# Patient Record
Sex: FEMALE | Race: WHITE | Employment: FULL TIME | URBAN - METROPOLITAN AREA
[De-identification: names, ages, dates, MRNs, and addresses within clinical notes are randomized per-mention and may not be internally consistent; named-entity substitution may affect disease eponyms.]

---

## 2016-12-30 LAB
BASOPHILS ABSOLUTE: 0 /ΜL
BASOPHILS RELATIVE PERCENT: 0.6 %
BUN BLDV-MCNC: 14 MG/DL
CALCIUM SERPL-MCNC: 9 MG/DL
CHLORIDE BLD-SCNC: 105 MMOL/L
CHOLESTEROL, TOTAL: 170 MG/DL
CHOLESTEROL/HDL RATIO: 2.7
CO2: 28 MMOL/L
CREAT SERPL-MCNC: 0.68 MG/DL
EOSINOPHILS ABSOLUTE: 0.2 /ΜL
EOSINOPHILS RELATIVE PERCENT: 2.1 %
GFR CALCULATED: >60
GLUCOSE BLD-MCNC: 91 MG/DL
HCT VFR BLD CALC: 34.6 % (ref 36–46)
HDLC SERPL-MCNC: 64 MG/DL (ref 35–70)
HEMOGLOBIN: 11.9 G/DL (ref 12–16)
LDL CHOLESTEROL CALCULATED: 94 MG/DL (ref 0–160)
LYMPHOCYTES ABSOLUTE: 2.3 /ΜL
LYMPHOCYTES RELATIVE PERCENT: 29.7 %
MCH RBC QN AUTO: 30.9 PG
MCHC RBC AUTO-ENTMCNC: 34.3 G/DL
MCV RBC AUTO: 90 FL
MONOCYTES ABSOLUTE: 0.7 /ΜL
MONOCYTES RELATIVE PERCENT: 8.8 %
NEUTROPHILS ABSOLUTE: 4.5 /ΜL
NEUTROPHILS RELATIVE PERCENT: 58.8 %
PDW BLD-RTO: 12.8 %
PLATELET # BLD: 262 K/ΜL
PMV BLD AUTO: 8.1 FL
POTASSIUM SERPL-SCNC: 4.2 MMOL/L
RBC # BLD: 3.85 10^6/ΜL
SODIUM BLD-SCNC: 140 MMOL/L
TRIGL SERPL-MCNC: 59 MG/DL
VLDLC SERPL CALC-MCNC: 12 MG/DL
WBC # BLD: 7.6 10^3/ML

## 2017-01-03 DIAGNOSIS — Z00.00 PHYSICAL EXAM, ANNUAL: ICD-10-CM

## 2017-05-07 ENCOUNTER — APPOINTMENT (OUTPATIENT)
Dept: CT IMAGING | Age: 37
End: 2017-05-07
Payer: COMMERCIAL

## 2017-05-07 ENCOUNTER — HOSPITAL ENCOUNTER (EMERGENCY)
Age: 37
Discharge: HOME | End: 2017-05-07
Attending: EMERGENCY MEDICINE
Payer: COMMERCIAL

## 2017-05-07 VITALS
SYSTOLIC BLOOD PRESSURE: 125 MMHG | RESPIRATION RATE: 16 BRPM | DIASTOLIC BLOOD PRESSURE: 62 MMHG | HEART RATE: 64 BPM | HEIGHT: 69 IN | OXYGEN SATURATION: 99 % | WEIGHT: 131 LBS | BODY MASS INDEX: 19.4 KG/M2 | TEMPERATURE: 97.2 F

## 2017-05-07 DIAGNOSIS — K52.9 GASTROENTERITIS: Primary | ICD-10-CM

## 2017-05-07 LAB
% CKMB: 2 % (ref 0–3)
ABSOLUTE EOS #: 0.2 K/UL (ref 0–0.4)
ABSOLUTE LYMPH #: 1.1 K/UL (ref 1–4.8)
ABSOLUTE MONO #: 0.5 K/UL (ref 0.2–0.8)
ALBUMIN SERPL-MCNC: 3.9 G/DL (ref 3.5–5.2)
ALBUMIN/GLOBULIN RATIO: ABNORMAL (ref 1–2.5)
ALP BLD-CCNC: 50 U/L (ref 35–104)
ALT SERPL-CCNC: 15 U/L (ref 5–33)
AMYLASE: 42 U/L (ref 28–100)
ANION GAP SERPL CALCULATED.3IONS-SCNC: 14 MMOL/L (ref 9–17)
AST SERPL-CCNC: 30 U/L
BASOPHILS # BLD: 0 %
BASOPHILS ABSOLUTE: 0 K/UL (ref 0–0.2)
BILIRUB SERPL-MCNC: 0.26 MG/DL (ref 0.3–1.2)
BILIRUBIN DIRECT: <0.08 MG/DL
BILIRUBIN, INDIRECT: ABNORMAL MG/DL (ref 0–1)
BUN BLDV-MCNC: 13 MG/DL (ref 6–20)
BUN/CREAT BLD: 25 (ref 9–20)
CALCIUM SERPL-MCNC: 8.8 MG/DL (ref 8.6–10.4)
CHLORIDE BLD-SCNC: 100 MMOL/L (ref 98–107)
CK MB: 1.4 NG/ML
CKMB INTERPRETATION: NORMAL
CO2: 24 MMOL/L (ref 20–31)
CREAT SERPL-MCNC: 0.53 MG/DL (ref 0.5–0.9)
DIFFERENTIAL TYPE: ABNORMAL
EOSINOPHILS RELATIVE PERCENT: 2 %
GFR AFRICAN AMERICAN: >60 ML/MIN
GFR NON-AFRICAN AMERICAN: >60 ML/MIN
GFR SERPL CREATININE-BSD FRML MDRD: ABNORMAL ML/MIN/{1.73_M2}
GFR SERPL CREATININE-BSD FRML MDRD: ABNORMAL ML/MIN/{1.73_M2}
GLOBULIN: ABNORMAL G/DL (ref 1.5–3.8)
GLUCOSE BLD-MCNC: 135 MG/DL (ref 70–99)
HCG QUALITATIVE: NEGATIVE
HCT VFR BLD CALC: 34 % (ref 36–46)
HEMOGLOBIN: 11.8 G/DL (ref 12–16)
LACTIC ACID: 2 MMOL/L (ref 0.5–2.2)
LIPASE: 30 U/L (ref 13–60)
LYMPHOCYTES # BLD: 11 %
MCH RBC QN AUTO: 31.6 PG (ref 26–34)
MCHC RBC AUTO-ENTMCNC: 34.7 G/DL (ref 31–37)
MCV RBC AUTO: 91 FL (ref 80–100)
MONOCYTES # BLD: 5 %
MYOGLOBIN: <21 NG/ML (ref 25–58)
PDW BLD-RTO: 13 % (ref 11.5–14.5)
PLATELET # BLD: 300 K/UL (ref 130–400)
PLATELET ESTIMATE: ABNORMAL
PMV BLD AUTO: 7 FL (ref 6–12)
POTASSIUM SERPL-SCNC: 3.1 MMOL/L (ref 3.7–5.3)
RBC # BLD: 3.74 M/UL (ref 4–5.2)
RBC # BLD: ABNORMAL 10*6/UL
SEG NEUTROPHILS: 82 %
SEGMENTED NEUTROPHILS ABSOLUTE COUNT: 7.8 K/UL (ref 1.8–7.7)
SODIUM BLD-SCNC: 138 MMOL/L (ref 135–144)
TOTAL CK: 70 U/L (ref 26–192)
TOTAL PROTEIN: 6.5 G/DL (ref 6.4–8.3)
TROPONIN INTERP: ABNORMAL
TROPONIN T: <0.03 NG/ML
WBC # BLD: 9.6 K/UL (ref 3.5–11)
WBC # BLD: ABNORMAL 10*3/UL

## 2017-05-07 PROCEDURE — 36415 COLL VENOUS BLD VENIPUNCTURE: CPT

## 2017-05-07 PROCEDURE — 93005 ELECTROCARDIOGRAM TRACING: CPT

## 2017-05-07 PROCEDURE — 74177 CT ABD & PELVIS W/CONTRAST: CPT

## 2017-05-07 PROCEDURE — 80048 BASIC METABOLIC PNL TOTAL CA: CPT

## 2017-05-07 PROCEDURE — 6360000002 HC RX W HCPCS: Performed by: EMERGENCY MEDICINE

## 2017-05-07 PROCEDURE — 85025 COMPLETE CBC W/AUTO DIFF WBC: CPT

## 2017-05-07 PROCEDURE — 82150 ASSAY OF AMYLASE: CPT

## 2017-05-07 PROCEDURE — 2580000003 HC RX 258: Performed by: EMERGENCY MEDICINE

## 2017-05-07 PROCEDURE — 96361 HYDRATE IV INFUSION ADD-ON: CPT

## 2017-05-07 PROCEDURE — 84484 ASSAY OF TROPONIN QUANT: CPT

## 2017-05-07 PROCEDURE — 84703 CHORIONIC GONADOTROPIN ASSAY: CPT

## 2017-05-07 PROCEDURE — C9113 INJ PANTOPRAZOLE SODIUM, VIA: HCPCS | Performed by: EMERGENCY MEDICINE

## 2017-05-07 PROCEDURE — 83690 ASSAY OF LIPASE: CPT

## 2017-05-07 PROCEDURE — 82550 ASSAY OF CK (CPK): CPT

## 2017-05-07 PROCEDURE — 82553 CREATINE MB FRACTION: CPT

## 2017-05-07 PROCEDURE — 96375 TX/PRO/DX INJ NEW DRUG ADDON: CPT

## 2017-05-07 PROCEDURE — 83874 ASSAY OF MYOGLOBIN: CPT

## 2017-05-07 PROCEDURE — 80076 HEPATIC FUNCTION PANEL: CPT

## 2017-05-07 PROCEDURE — 99284 EMERGENCY DEPT VISIT MOD MDM: CPT

## 2017-05-07 PROCEDURE — 83605 ASSAY OF LACTIC ACID: CPT

## 2017-05-07 PROCEDURE — 96374 THER/PROPH/DIAG INJ IV PUSH: CPT

## 2017-05-07 PROCEDURE — 6360000004 HC RX CONTRAST MEDICATION: Performed by: EMERGENCY MEDICINE

## 2017-05-07 RX ORDER — 0.9 % SODIUM CHLORIDE 0.9 %
1000 INTRAVENOUS SOLUTION INTRAVENOUS ONCE
Status: COMPLETED | OUTPATIENT
Start: 2017-05-07 | End: 2017-05-07

## 2017-05-07 RX ORDER — ONDANSETRON 2 MG/ML
8 INJECTION INTRAMUSCULAR; INTRAVENOUS ONCE
Status: COMPLETED | OUTPATIENT
Start: 2017-05-07 | End: 2017-05-07

## 2017-05-07 RX ORDER — ONDANSETRON 4 MG/1
4 TABLET, FILM COATED ORAL EVERY 6 HOURS PRN
Qty: 10 TABLET | Refills: 0 | Status: SHIPPED | OUTPATIENT
Start: 2017-05-07 | End: 2017-07-07

## 2017-05-07 RX ORDER — DICYCLOMINE HYDROCHLORIDE 10 MG/1
10 CAPSULE ORAL 3 TIMES DAILY PRN
Qty: 20 CAPSULE | Refills: 0 | Status: SHIPPED | OUTPATIENT
Start: 2017-05-07 | End: 2017-07-07 | Stop reason: ALTCHOICE

## 2017-05-07 RX ORDER — 0.9 % SODIUM CHLORIDE 0.9 %
10 VIAL (ML) INJECTION ONCE
Status: COMPLETED | OUTPATIENT
Start: 2017-05-07 | End: 2017-05-07

## 2017-05-07 RX ORDER — PANTOPRAZOLE SODIUM 40 MG/10ML
40 INJECTION, POWDER, LYOPHILIZED, FOR SOLUTION INTRAVENOUS ONCE
Status: COMPLETED | OUTPATIENT
Start: 2017-05-07 | End: 2017-05-07

## 2017-05-07 RX ADMIN — IOVERSOL 125 ML: 741 INJECTION INTRA-ARTERIAL; INTRAVENOUS at 06:29

## 2017-05-07 RX ADMIN — PANTOPRAZOLE SODIUM 40 MG: 40 INJECTION, POWDER, FOR SOLUTION INTRAVENOUS at 05:50

## 2017-05-07 RX ADMIN — SODIUM CHLORIDE 1000 ML: 9 INJECTION, SOLUTION INTRAVENOUS at 05:50

## 2017-05-07 RX ADMIN — HYDROMORPHONE HYDROCHLORIDE 1 MG: 1 INJECTION, SOLUTION INTRAMUSCULAR; INTRAVENOUS; SUBCUTANEOUS at 05:50

## 2017-05-07 RX ADMIN — SODIUM CHLORIDE 10 ML: 9 INJECTION, SOLUTION INTRAMUSCULAR; INTRAVENOUS; SUBCUTANEOUS at 05:57

## 2017-05-07 RX ADMIN — ONDANSETRON 8 MG: 2 INJECTION INTRAMUSCULAR; INTRAVENOUS at 05:49

## 2017-05-07 ASSESSMENT — PAIN DESCRIPTION - ORIENTATION: ORIENTATION: MID;LOWER

## 2017-05-07 ASSESSMENT — ENCOUNTER SYMPTOMS
NAUSEA: 1
EYES NEGATIVE: 1
ABDOMINAL PAIN: 1
ABDOMINAL DISTENTION: 1

## 2017-05-07 ASSESSMENT — PAIN DESCRIPTION - FREQUENCY: FREQUENCY: CONTINUOUS

## 2017-05-07 ASSESSMENT — PAIN DESCRIPTION - LOCATION: LOCATION: ABDOMEN

## 2017-05-07 ASSESSMENT — PAIN DESCRIPTION - PAIN TYPE: TYPE: ACUTE PAIN

## 2017-05-07 ASSESSMENT — PAIN DESCRIPTION - PROGRESSION: CLINICAL_PROGRESSION: NOT CHANGED

## 2017-05-07 ASSESSMENT — PAIN SCALES - GENERAL: PAINLEVEL_OUTOF10: 8

## 2017-05-07 ASSESSMENT — PAIN DESCRIPTION - DESCRIPTORS: DESCRIPTORS: SHARP

## 2017-05-08 LAB
EKG ATRIAL RATE: 72 BPM
EKG P AXIS: 66 DEGREES
EKG P-R INTERVAL: 150 MS
EKG Q-T INTERVAL: 414 MS
EKG QRS DURATION: 86 MS
EKG QTC CALCULATION (BAZETT): 453 MS
EKG R AXIS: 56 DEGREES
EKG T AXIS: 72 DEGREES
EKG VENTRICULAR RATE: 72 BPM

## 2017-05-10 ENCOUNTER — OFFICE VISIT (OUTPATIENT)
Dept: FAMILY MEDICINE CLINIC | Age: 37
End: 2017-05-10
Payer: COMMERCIAL

## 2017-05-10 VITALS
DIASTOLIC BLOOD PRESSURE: 60 MMHG | SYSTOLIC BLOOD PRESSURE: 116 MMHG | BODY MASS INDEX: 19.05 KG/M2 | HEART RATE: 65 BPM | WEIGHT: 129 LBS

## 2017-05-10 DIAGNOSIS — K52.9 AGE (ACUTE GASTROENTERITIS): Primary | ICD-10-CM

## 2017-05-10 PROCEDURE — 99213 OFFICE O/P EST LOW 20 MIN: CPT | Performed by: FAMILY MEDICINE

## 2017-05-10 ASSESSMENT — PATIENT HEALTH QUESTIONNAIRE - PHQ9
2. FEELING DOWN, DEPRESSED OR HOPELESS: 0
1. LITTLE INTEREST OR PLEASURE IN DOING THINGS: 0
SUM OF ALL RESPONSES TO PHQ QUESTIONS 1-9: 0
SUM OF ALL RESPONSES TO PHQ9 QUESTIONS 1 & 2: 0

## 2017-05-10 ASSESSMENT — ENCOUNTER SYMPTOMS
BACK PAIN: 0
SHORTNESS OF BREATH: 0
DIARRHEA: 0
ABDOMINAL PAIN: 1
NAUSEA: 0
SINUS PRESSURE: 0
VOMITING: 0
SORE THROAT: 0
COUGH: 0

## 2017-07-07 ENCOUNTER — OFFICE VISIT (OUTPATIENT)
Dept: FAMILY MEDICINE CLINIC | Age: 37
End: 2017-07-07
Payer: COMMERCIAL

## 2017-07-07 VITALS
HEART RATE: 67 BPM | HEIGHT: 70 IN | WEIGHT: 130.8 LBS | OXYGEN SATURATION: 98 % | BODY MASS INDEX: 18.73 KG/M2 | DIASTOLIC BLOOD PRESSURE: 72 MMHG | SYSTOLIC BLOOD PRESSURE: 114 MMHG

## 2017-07-07 DIAGNOSIS — J06.9 VIRAL UPPER RESPIRATORY TRACT INFECTION: Primary | ICD-10-CM

## 2017-07-07 PROCEDURE — 99213 OFFICE O/P EST LOW 20 MIN: CPT | Performed by: FAMILY MEDICINE

## 2017-11-01 ENCOUNTER — OFFICE VISIT (OUTPATIENT)
Dept: FAMILY MEDICINE CLINIC | Age: 37
End: 2017-11-01
Payer: COMMERCIAL

## 2017-11-01 VITALS
SYSTOLIC BLOOD PRESSURE: 116 MMHG | BODY MASS INDEX: 19.07 KG/M2 | TEMPERATURE: 98.7 F | DIASTOLIC BLOOD PRESSURE: 68 MMHG | WEIGHT: 131 LBS | HEART RATE: 71 BPM

## 2017-11-01 DIAGNOSIS — J40 BRONCHITIS: Primary | ICD-10-CM

## 2017-11-01 PROCEDURE — 99213 OFFICE O/P EST LOW 20 MIN: CPT | Performed by: FAMILY MEDICINE

## 2017-11-01 RX ORDER — AZITHROMYCIN 250 MG/1
TABLET, FILM COATED ORAL
Qty: 1 PACKET | Refills: 0 | Status: SHIPPED | OUTPATIENT
Start: 2017-11-01 | End: 2017-11-27 | Stop reason: ALTCHOICE

## 2017-11-01 ASSESSMENT — ENCOUNTER SYMPTOMS
BACK PAIN: 0
SINUS PAIN: 1
NAUSEA: 0
DIARRHEA: 0
SHORTNESS OF BREATH: 0
COUGH: 1
SORE THROAT: 0
SINUS PRESSURE: 1
VOMITING: 0

## 2017-11-01 NOTE — PROGRESS NOTES
mouth 4 times daily as needed (cough) . 180 mL 0     No current facility-administered medications for this visit. No Known Allergies      Subjective:   Review of Systems   Constitutional: Negative for chills, diaphoresis and fever. HENT: Positive for congestion, sinus pain and sinus pressure. Negative for sore throat. Eyes: Negative for visual disturbance. Respiratory: Positive for cough. Negative for shortness of breath. Cardiovascular: Negative for chest pain and leg swelling. Gastrointestinal: Negative for diarrhea, nausea and vomiting. Genitourinary: Negative for dysuria, menstrual problem, urgency and vaginal discharge. Musculoskeletal: Negative for arthralgias, back pain and myalgias. Skin: Negative for rash. Neurological: Negative for weakness, numbness and headaches. Psychiatric/Behavioral: Positive for sleep disturbance. Objective:   /68   Pulse 71   Temp 98.7 °F (37.1 °C) (Oral)   Wt 131 lb (59.4 kg)   BMI 19.07 kg/m²     Physical Exam   Constitutional: She is oriented to person, place, and time. She appears well-developed and well-nourished. No distress. HENT:   Head: Normocephalic and atraumatic. Mouth/Throat: No oropharyngeal exudate. Eyes: No scleral icterus. Neck: Neck supple. Carotid bruit is not present. Cardiovascular: Exam reveals no gallop and no friction rub. No murmur heard. Pulmonary/Chest: No respiratory distress. She has no wheezes. She has no rales. She exhibits no tenderness. Musculoskeletal: She exhibits no edema. Lymphadenopathy:     She has no cervical adenopathy. Neurological: She is alert and oriented to person, place, and time. No cranial nerve deficit. Skin: No rash noted. She is not diaphoretic. Psychiatric: She has a normal mood and affect. Her behavior is normal. Judgment and thought content normal.       Assessment:      1. Bronchitis           Plan:      Return if symptoms worsen or fail to improve.     No orders of the defined types were placed in this encounter. Orders Placed This Encounter   Medications    azithromycin (ZITHROMAX Z-MARISOL) 250 MG tablet     Sig: Take 2 pills on day one then one pill daily til gone. Dispense:  1 packet     Refill:  0    HYDROcodone-homatropine (HYCODAN) 5-1.5 MG/5ML syrup     Sig: Take 5 mLs by mouth 4 times daily as needed (cough) .      Dispense:  180 mL     Refill:  0

## 2017-11-09 ENCOUNTER — TELEPHONE (OUTPATIENT)
Dept: FAMILY MEDICINE CLINIC | Age: 37
End: 2017-11-09

## 2017-11-09 RX ORDER — LEVOFLOXACIN 500 MG/1
500 TABLET, FILM COATED ORAL DAILY
Qty: 10 TABLET | Refills: 0 | Status: SHIPPED | OUTPATIENT
Start: 2017-11-09 | End: 2017-11-19

## 2017-11-09 NOTE — TELEPHONE ENCOUNTER
Health Maintenance   Topic Date Due    HIV screen  02/06/1996    DTaP/Tdap/Td vaccine (1 - Tdap) 02/06/2000    Cervical cancer screen  02/06/2002    Flu vaccine (1) 09/01/2017       No results found for: LABA1C          ( goal A1C is < 7)   No results found for: LABMICR  LDL Calculated (mg/dL)   Date Value   12/30/2016 94       (goal LDL is <100)   AST (U/L)   Date Value   05/07/2017 30     ALT (U/L)   Date Value   05/07/2017 15     BUN (mg/dL)   Date Value   05/07/2017 13     BP Readings from Last 3 Encounters:   11/01/17 116/68   07/07/17 114/72   05/10/17 116/60          (goal 120/80)    All Future Testing planned in [facility]      Next Visit Date:  No future appointments. There is no problem list on file for this patient.

## 2017-11-27 ENCOUNTER — OFFICE VISIT (OUTPATIENT)
Dept: FAMILY MEDICINE CLINIC | Age: 37
End: 2017-11-27
Payer: COMMERCIAL

## 2017-11-27 VITALS
SYSTOLIC BLOOD PRESSURE: 118 MMHG | BODY MASS INDEX: 18.63 KG/M2 | WEIGHT: 128 LBS | HEART RATE: 84 BPM | DIASTOLIC BLOOD PRESSURE: 68 MMHG

## 2017-11-27 DIAGNOSIS — J20.9 BRONCHITIS, ACUTE, WITH BRONCHOSPASM: Primary | ICD-10-CM

## 2017-11-27 PROCEDURE — 99213 OFFICE O/P EST LOW 20 MIN: CPT | Performed by: FAMILY MEDICINE

## 2017-11-27 RX ORDER — PREDNISONE 20 MG/1
TABLET ORAL
Qty: 20 TABLET | Refills: 0 | Status: SHIPPED | OUTPATIENT
Start: 2017-11-27 | End: 2018-02-28 | Stop reason: ALTCHOICE

## 2017-11-27 ASSESSMENT — ENCOUNTER SYMPTOMS
SINUS PRESSURE: 0
VOMITING: 0
BACK PAIN: 0
SHORTNESS OF BREATH: 0
COUGH: 1
NAUSEA: 0
DIARRHEA: 0
SORE THROAT: 0

## 2017-11-27 NOTE — PROGRESS NOTES
Miguel A Booth is a 40 y.o. female who presents today for her medical conditions/complaints as noted below. Miguel A Booth is c/o of Cough and Dizziness  Persistant cough irritative. HPI:     Visit Information    Have you changed or started any medications since your last visit including any over-the-counter medicines, vitamins, or herbal medicines? no   Are you having any side effects from any of your medications? -  no  Have you stopped taking any of your medications? Is so, why? -  no    Have you seen any other physician or provider since your last visit? No  Have you had any other diagnostic tests since your last visit? No  Have you been seen in the emergency room and/or had an admission to a hospital since we last saw you? No  Have you had your routine dental cleaning in the past 6 months? yes -     Have you activated your [employer] account? If not, what are your barriers? No:      Patient Care Team:  Geovanny Tejada MD as PCP - General (Family Medicine)    Medical History Review  Past Medical, Family, and Social History reviewed and  contribute to the patient presenting condition    Health Maintenance   Topic Date Due    HIV screen  02/06/1996    DTaP/Tdap/Td vaccine (1 - Tdap) 02/06/2000    Cervical cancer screen  02/06/2002    Flu vaccine (1) 09/01/2017       Past Medical History:   Diagnosis Date    Allergic rhinitis       Past Surgical History:   Procedure Laterality Date    EYE SURGERY Right     lazy eye as infant     No family history on file. Social History   Substance Use Topics    Smoking status: Never Smoker    Smokeless tobacco: Never Used    Alcohol use Yes      Comment: occasional      Current Outpatient Prescriptions   Medication Sig Dispense Refill    predniSONE (DELTASONE) 20 MG tablet Take 3 tabs daily for 3 days, then 2 a day for 3 days, then 1 a day for 3 days, then 1/2 a day for 3 days. 20 tablet 0     No current facility-administered medications for this visit.       No

## 2017-12-14 ENCOUNTER — TELEPHONE (OUTPATIENT)
Dept: FAMILY MEDICINE CLINIC | Age: 37
End: 2017-12-14

## 2017-12-14 DIAGNOSIS — R05.9 COUGH: Primary | ICD-10-CM

## 2017-12-14 RX ORDER — LEVOFLOXACIN 500 MG/1
500 TABLET, FILM COATED ORAL DAILY
Qty: 10 TABLET | Refills: 0 | Status: SHIPPED | OUTPATIENT
Start: 2017-12-14 | End: 2017-12-24

## 2017-12-14 NOTE — TELEPHONE ENCOUNTER
Message was left on   for the patient to call back. Order for Chest xray sent to [facility]  scheduling. Order for PFT sent to [facility] Pulmonary medicine fax number [phone].  Medication sent to the pharmacy

## 2017-12-14 NOTE — TELEPHONE ENCOUNTER
CXR PA and lateral view. Levaquin 500 mg qd for 10 days. PFT pre and post bronchodilator. If sx persist follow up appt needed.

## 2018-02-28 ENCOUNTER — OFFICE VISIT (OUTPATIENT)
Dept: FAMILY MEDICINE CLINIC | Age: 38
End: 2018-02-28
Payer: COMMERCIAL

## 2018-02-28 VITALS
TEMPERATURE: 99 F | DIASTOLIC BLOOD PRESSURE: 70 MMHG | SYSTOLIC BLOOD PRESSURE: 116 MMHG | HEART RATE: 87 BPM | OXYGEN SATURATION: 91 % | WEIGHT: 132 LBS | BODY MASS INDEX: 18.48 KG/M2 | HEIGHT: 71 IN

## 2018-02-28 DIAGNOSIS — B34.9 VIRAL SYNDROME: Primary | ICD-10-CM

## 2018-02-28 PROCEDURE — 99213 OFFICE O/P EST LOW 20 MIN: CPT | Performed by: FAMILY MEDICINE

## 2018-02-28 ASSESSMENT — ENCOUNTER SYMPTOMS
SORE THROAT: 1
SHORTNESS OF BREATH: 0
SINUS PRESSURE: 0
BACK PAIN: 0
NAUSEA: 0
COUGH: 1
VOMITING: 0
DIARRHEA: 0

## 2018-02-28 NOTE — PROGRESS NOTES
Morgan Whitlock is a 41 y.o. female who presents today for her medical conditions/complaints as noted below. Morgan Whitlock is c/o of Fever; Headache; and Pharyngitis  URI sx for a few days slow onset. HPI:     Visit Information    Have you changed or started any medications since your last visit including any over-the-counter medicines, vitamins, or herbal medicines? no   Are you having any side effects from any of your medications? -  no  Have you stopped taking any of your medications? Is so, why? -  no    Have you seen any other physician or provider since your last visit? No  Have you had any other diagnostic tests since your last visit? No  Have you been seen in the emergency room and/or had an admission to a hospital since we last saw you? No  Have you had your routine dental cleaning in the past 6 months? yes -     Have you activated your [employer] account? If not, what are your barriers? No:      Patient Care Team:  Dot Ramos MD as PCP - General (Family Medicine)    Medical History Review  Past Medical, Family, and Social History reviewed and  contribute to the patient presenting condition    Health Maintenance   Topic Date Due    HIV screen  02/06/1996    DTaP/Tdap/Td vaccine (1 - Tdap) 02/06/2000    Cervical cancer screen  02/06/2002    Flu vaccine (1) 09/01/2017       Past Medical History:   Diagnosis Date    Allergic rhinitis       Past Surgical History:   Procedure Laterality Date    EYE SURGERY Right     lazy eye as infant     No family history on file. Social History   Substance Use Topics    Smoking status: Never Smoker    Smokeless tobacco: Never Used    Alcohol use Yes      Comment: occasional      Current Outpatient Prescriptions   Medication Sig Dispense Refill    HYDROcodone-homatropine (HYCODAN) 5-1.5 MG/5ML syrup Take 5 mLs by mouth 4 times daily as needed (cough) for up to 7 days. 180 mL 0     No current facility-administered medications for this visit.       No Known normal mood and affect. Her behavior is normal. Judgment and thought content normal.       Assessment:      1. Viral syndrome  HYDROcodone-homatropine (HYCODAN) 5-1.5 MG/5ML syrup         Plan:      No Follow-up on file. No orders of the defined types were placed in this encounter. Orders Placed This Encounter   Medications    HYDROcodone-homatropine (HYCODAN) 5-1.5 MG/5ML syrup     Sig: Take 5 mLs by mouth 4 times daily as needed (cough) for up to 7 days.      Dispense:  180 mL     Refill:  0

## 2018-03-05 ENCOUNTER — TELEPHONE (OUTPATIENT)
Dept: FAMILY MEDICINE CLINIC | Age: 38
End: 2018-03-05

## 2018-03-05 RX ORDER — AZITHROMYCIN 250 MG/1
TABLET, FILM COATED ORAL
Qty: 1 PACKET | Refills: 0 | Status: SHIPPED | OUTPATIENT
Start: 2018-03-05 | End: 2020-07-22

## 2020-07-22 ENCOUNTER — OFFICE VISIT (OUTPATIENT)
Dept: DERMATOLOGY | Age: 40
End: 2020-07-22
Payer: COMMERCIAL

## 2020-07-22 VITALS
DIASTOLIC BLOOD PRESSURE: 78 MMHG | TEMPERATURE: 97 F | HEART RATE: 69 BPM | HEIGHT: 70 IN | BODY MASS INDEX: 19.13 KG/M2 | SYSTOLIC BLOOD PRESSURE: 120 MMHG | WEIGHT: 133.6 LBS | OXYGEN SATURATION: 99 %

## 2020-07-22 PROCEDURE — 99203 OFFICE O/P NEW LOW 30 MIN: CPT | Performed by: DERMATOLOGY

## 2020-07-22 NOTE — PATIENT INSTRUCTIONS
Moles    Moles, or nevi, are very common. Moles are areas of the skin where there are more cells called melanocytes. Melanocytes are the cells in the body that produce pigment, or color. Moles can be many colors including skin-tone, pink, tan, brown, and very dark brown to black. Moles can be raised or flat. Moles can have hair. Moles can grow on any skin surface, including the scalp, hands and feet. When someone is born with a mole, or develops one in the first months of life, the mole is called a congenital, or birthmark mole. About 1 in 100 people are born with one or more moles. Most people develop their moles later in childhood or adulthood. These are called acquired moles. They are most common on sun exposed areas of skin such as the face, neck, upper body, arms and legs. CHECKING MOLES  Most moles are harmless, but in rare cases moles may become cancerous. Checking moles and looking for changes is an important step in helping to catch worrisome changes early. Some changes to look for are asymmetry (moles that do not look the same on each half), irregular shapes or borders, uneven color or large size. Also look for any moles that bleed, itch, or become painful. Looking at your skin regularly can help you recognize moles that are more at risk for becoming cancerous. WHEN TO CALL THE DOCTOR  Call your doctor if you see any of the following changes in a mole:       Irregular borders (uneven shape or edges)       Changes in color to black, blue or red.      Changes in the surface texture       Scabs, scaling, irritation or bleeding in the mole    TREATMENT FOR MOLES  Often we can simply look at your moles and tell you if they look worrisome. If we are not concerned about the look of your moles at your appointment, we may measure some moles and take some photos that will allow us to watch for future changes in the moles.      TREATMENT FOR MOLES  If a mole is getting irritated frequently, bleeding, difficult to watch due to location or dark color, atypical in appearance, or worrisome, we may perform a skin biopsy. A skin biopsy is a procedure that involves removing the mole so that it can be looked at under a microscope. There are many methods used to remove moles. The method we choose depends on the location of the mole, the size of the mole, and the amount of concern for skin cancer. Generally, removing moles in the dermatologists office is a simple and safe procedure that can be done with local anesthesia. PREVENTION  You can do some things to prevent moles from becoming cancerous:       Try to avoid long periods of time in the sun and severe sunburns. The sun is        especially dangerous between 10:00 am and 4:00 pm.       Use a broad spectrum, water-resistant sun block lotion with an SPF of 30 or        greater. A broad spectrum lotion blocks both UVA and UVB rays from the sun. Re-apply sunscreen at least every 2 hours and after swimming or sweating.      Take advantage of shade whenever possible. Wear a broad-brimmed hat,        sunglasses, and protective clothing when outdoors.      Do not use tanning beds. Red moles, or cherry angiomas, are common skin growths that can develop on most areas of your body. They're also known as senile angiomas or [employer] spots. Tete Zahida usually found on people aged 27 and older. The collection of small blood vessels inside a cherry angioma give them a reddish appearance.

## 2020-07-22 NOTE — PROGRESS NOTES
Dermatology Patient Note  [facility]  [address]  [address] [place] [address]  Dept: [phone]  Dept Fax: [phone]      VISIT DATE: 7/22/2020   REFERRING PROVIDER: No ref. provider found      Rian Mello is a 45 y.o. female  who presents today in the office for:    New Patient (Some spots of concern on the neck and stomach. NO hx or fam hx of skin cancer)      HISTORY OF PRESENT ILLNESS:  HPI Nevus/Nevi     Phillip Escoto was seen today for initial evaluation of Common Nevi    Duration of Lesion/Lesions:several years    Course: stable    Areas of Involvement: abdomen> rest of body    Associated Symptoms:None    Exacerbating Factors:None    Previous Evaluation: None    Problem Specific Family Hx: No history of atypical nevi              CURRENT MEDICATIONS:   No current outpatient medications on file. No current facility-administered medications for this visit. ALLERGIES:   No Known Allergies    SOCIAL HISTORY:  Social History     Tobacco Use    Smoking status: Never Smoker    Smokeless tobacco: Never Used   Substance Use Topics    Alcohol use: Yes     Comment: occasional       REVIEW OF SYSTEMS:  Review of Systems  Skin:Denies any new changing, growing or bleeding lesions or rashes except as described in the HPI     PHYSICAL EXAM:   /78 (Site: Right Upper Arm, Position: Sitting, Cuff Size: Medium Adult)   Pulse 69   Temp 97 °F (36.1 °C)   Ht 5' 10\" (1.778 m)   Wt 133 lb 9.6 oz (60.6 kg)   LMP 06/27/2020   SpO2 99%   BMI 19.17 kg/m²     General Exam:  General Appearance: No acute distress, Well nourished     Neuro: Alert and oriented to person, place and time  Psych: Normal affect   Lymph Node: Not performed    Cutaneous Exam: Performed as documented in clinic note below. Total skin excluding undergarment areas, which includes the head/face, neck, both arms, chest, back, abdomen, both legs, digits and/or nails, was examined.     Pertinent Physical Exam Findings:  Physical Exam  Skin:     Comments: Scattered tan to brown homogenous macules and thin papules with regular borders on the face, trunk and upper and lower extremities    Moles on abdomen slightly larger and darker than rest of body recheck in 3 months to ensure stability         Medical Necessity of Exam Performed:   Distribution of patient concerns    Additional Diagnostic Testing performed during exam: Not performed ,  Not performed    ASSESSMENT:   Diagnosis Orders   1. Multiple nevi         Plan of Action is as Follows:  Assessment 1. Multiple nevi  - Clinically and Dermatoscopically Benign on Exam Today  - Reviewed ABCDE of moles and melanoma  - Discussed sunscreen and sun protection - recommend SPF 30 or greater sunscreen applied every 2-3 hours, sun protective clothing and avoidance of peak sun.  - Photographs of abdominal nevi - recheck in 3 months            Patient Instructions   Moles    Moles, or nevi, are very common. Moles are areas of the skin where there are more cells called melanocytes. Melanocytes are the cells in the body that produce pigment, or color. Moles can be many colors including skin-tone, pink, tan, brown, and very dark brown to black. Moles can be raised or flat. Moles can have hair. Moles can grow on any skin surface, including the scalp, hands and feet. When someone is born with a mole, or develops one in the first months of life, the mole is called a congenital, or birthmark mole. About 1 in 100 people are born with one or more moles. Most people develop their moles later in childhood or adulthood. These are called acquired moles. They are most common on sun exposed areas of skin such as the face, neck, upper body, arms and legs. CHECKING MOLES  Most moles are harmless, but in rare cases moles may become cancerous. Checking moles and looking for changes is an important step in helping to catch worrisome changes early.  Some changes to look for are asymmetry (moles that do not look the same on each half), irregular shapes or borders, uneven color or large size. Also look for any moles that bleed, itch, or become painful. Looking at your skin regularly can help you recognize moles that are more at risk for becoming cancerous. WHEN TO CALL THE DOCTOR  Call your doctor if you see any of the following changes in a mole:       Irregular borders (uneven shape or edges)       Changes in color to black, blue or red.      Changes in the surface texture       Scabs, scaling, irritation or bleeding in the mole    TREATMENT FOR MOLES  Often we can simply look at your moles and tell you if they look worrisome. If we are not concerned about the look of your moles at your appointment, we may measure some moles and take some photos that will allow us to watch for future changes in the moles. TREATMENT FOR MOLES  If a mole is getting irritated frequently, bleeding, difficult to watch due to location or dark color, atypical in appearance, or worrisome, we may perform a skin biopsy. A skin biopsy is a procedure that involves removing the mole so that it can be looked at under a microscope. There are many methods used to remove moles. The method we choose depends on the location of the mole, the size of the mole, and the amount of concern for skin cancer. Generally, removing moles in the dermatologists office is a simple and safe procedure that can be done with local anesthesia. PREVENTION  You can do some things to prevent moles from becoming cancerous:       Try to avoid long periods of time in the sun and severe sunburns. The sun is        especially dangerous between 10:00 am and 4:00 pm.       Use a broad spectrum, water-resistant sun block lotion with an SPF of 30 or        greater. A broad spectrum lotion blocks both UVA and UVB rays from the sun. Re-apply sunscreen at least every 2 hours and after swimming or sweating.        Take advantage of shade whenever possible. Wear a broad-brimmed hat,        sunglasses, and protective clothing when outdoors.      Do not use tanning beds. Red moles, or cherry angiomas, are common skin growths that can develop on most areas of your body. They're also known as senile angiomas or [employer] spots. Killian Kohut usually found on people aged 27 and older. The collection of small blood vessels inside a cherry angioma give them a reddish appearance. Photo surveillance performed: Yes    Follow-up: Return in about 3 months (around 10/22/2020). This note was created with the assistance of [employer] program.  Although the intention is to generate a document that actually reflects thecontent of the visit, no guarantees can be provided that every mistake has been identified and corrected by editing.     Electronically signed by Leyda Sarmiento MD on 7/22/20 at 3:12 PM EDT

## 2020-09-21 ENCOUNTER — OFFICE VISIT (OUTPATIENT)
Dept: FAMILY MEDICINE CLINIC | Age: 40
End: 2020-09-21
Payer: COMMERCIAL

## 2020-09-21 VITALS
WEIGHT: 136.2 LBS | HEIGHT: 70 IN | TEMPERATURE: 97.9 F | HEART RATE: 77 BPM | DIASTOLIC BLOOD PRESSURE: 80 MMHG | OXYGEN SATURATION: 99 % | SYSTOLIC BLOOD PRESSURE: 120 MMHG | BODY MASS INDEX: 19.5 KG/M2

## 2020-09-21 PROCEDURE — 90686 IIV4 VACC NO PRSV 0.5 ML IM: CPT | Performed by: FAMILY MEDICINE

## 2020-09-21 PROCEDURE — 99213 OFFICE O/P EST LOW 20 MIN: CPT | Performed by: FAMILY MEDICINE

## 2020-09-21 PROCEDURE — 90471 IMMUNIZATION ADMIN: CPT | Performed by: FAMILY MEDICINE

## 2020-09-21 SDOH — ECONOMIC STABILITY: FOOD INSECURITY: WITHIN THE PAST 12 MONTHS, THE FOOD YOU BOUGHT JUST DIDN'T LAST AND YOU DIDN'T HAVE MONEY TO GET MORE.: NEVER TRUE

## 2020-09-21 SDOH — ECONOMIC STABILITY: INCOME INSECURITY: HOW HARD IS IT FOR YOU TO PAY FOR THE VERY BASICS LIKE FOOD, HOUSING, MEDICAL CARE, AND HEATING?: NOT HARD AT ALL

## 2020-09-21 SDOH — ECONOMIC STABILITY: FOOD INSECURITY: WITHIN THE PAST 12 MONTHS, YOU WORRIED THAT YOUR FOOD WOULD RUN OUT BEFORE YOU GOT MONEY TO BUY MORE.: NEVER TRUE

## 2020-09-21 SDOH — ECONOMIC STABILITY: TRANSPORTATION INSECURITY
IN THE PAST 12 MONTHS, HAS THE LACK OF TRANSPORTATION KEPT YOU FROM MEDICAL APPOINTMENTS OR FROM GETTING MEDICATIONS?: NO

## 2020-09-21 SDOH — ECONOMIC STABILITY: TRANSPORTATION INSECURITY
IN THE PAST 12 MONTHS, HAS LACK OF TRANSPORTATION KEPT YOU FROM MEETINGS, WORK, OR FROM GETTING THINGS NEEDED FOR DAILY LIVING?: NO

## 2020-09-21 ASSESSMENT — ENCOUNTER SYMPTOMS
COUGH: 0
VOMITING: 0
SINUS PRESSURE: 0
DIARRHEA: 0
SORE THROAT: 0
BACK PAIN: 0
SHORTNESS OF BREATH: 0
NAUSEA: 0

## 2020-09-21 ASSESSMENT — PATIENT HEALTH QUESTIONNAIRE - PHQ9
1. LITTLE INTEREST OR PLEASURE IN DOING THINGS: 0
SUM OF ALL RESPONSES TO PHQ QUESTIONS 1-9: 0
SUM OF ALL RESPONSES TO PHQ9 QUESTIONS 1 & 2: 0
2. FEELING DOWN, DEPRESSED OR HOPELESS: 0
SUM OF ALL RESPONSES TO PHQ QUESTIONS 1-9: 0

## 2020-09-21 NOTE — PROGRESS NOTES
Joey Dennis is a 45 y.o. female who presents todayfor her medical conditions/complaints as noted below. Joey Dennis is here today c/oHand Injury (left)      :     HPI    Pain in left hand since injury playing volleyball a few months ago. Still with persistant pain in left hand especially second MP joint. Past Medical History:   Diagnosis Date    Allergic rhinitis       Past Surgical History:   Procedure Laterality Date    EYE SURGERY Right     lazy eye as infant     No family history on file. Social History     Tobacco Use    Smoking status: Never Smoker    Smokeless tobacco: Never Used   Substance Use Topics    Alcohol use: Yes     Comment: occasional      No current outpatient medications on file. No current facility-administered medications for this visit. No Known Allergies      Subjective:   Review of Systems   Constitutional: Negative for chills, diaphoresis and fever. HENT: Negative for congestion, sinus pressure and sore throat. Eyes: Negative for visual disturbance. Respiratory: Negative for cough and shortness of breath. Cardiovascular: Negative for chest pain and leg swelling. Gastrointestinal: Negative for diarrhea, nausea and vomiting. Genitourinary: Negative for dysuria, menstrual problem, urgency and vaginal discharge. Musculoskeletal: Positive for arthralgias. Negative for back pain and myalgias. Skin: Negative for rash. Neurological: Negative for weakness, numbness and headaches. Psychiatric/Behavioral: Negative for sleep disturbance.       :   /80   Pulse 77   Temp 97.9 °F (36.6 °C)   Ht 5' 10\" (1.778 m)   Wt 136 lb 3.2 oz (61.8 kg)   SpO2 99%   BMI 19.54 kg/m²     Physical Exam  Constitutional:       General: She is not in acute distress. Appearance: She is well-developed. She is not diaphoretic. HENT:      Head: Normocephalic and atraumatic. Mouth/Throat:      Pharynx: No oropharyngeal exudate.    Eyes:      General: No

## 2020-09-22 ENCOUNTER — TELEPHONE (OUTPATIENT)
Dept: FAMILY MEDICINE CLINIC | Age: 40
End: 2020-09-22

## 2020-09-22 NOTE — TELEPHONE ENCOUNTER
PT called looking for xray results. Was not able to reach clinical staff. Please call her with an update on the results.

## 2020-09-23 ENCOUNTER — TELEPHONE (OUTPATIENT)
Dept: FAMILY MEDICINE CLINIC | Age: 40
End: 2020-09-23

## 2020-09-25 ENCOUNTER — TELEPHONE (OUTPATIENT)
Dept: FAMILY MEDICINE CLINIC | Age: 40
End: 2020-09-25

## 2020-09-25 NOTE — TELEPHONE ENCOUNTER
Pt needs referral for hand surgeon sent over to the following office/ doctor. Both are in [place] [facility]:    Dr. Gal Ohara and Dr. Robin Disla  Fax Number [phone]      Please call and let Pt know once complete.

## 2021-04-05 ENCOUNTER — OFFICE VISIT (OUTPATIENT)
Dept: DERMATOLOGY | Age: 41
End: 2021-04-05
Payer: COMMERCIAL

## 2021-04-05 VITALS
WEIGHT: 139.8 LBS | OXYGEN SATURATION: 100 % | TEMPERATURE: 97.6 F | HEART RATE: 74 BPM | SYSTOLIC BLOOD PRESSURE: 123 MMHG | DIASTOLIC BLOOD PRESSURE: 83 MMHG | BODY MASS INDEX: 20.71 KG/M2 | HEIGHT: 69 IN

## 2021-04-05 DIAGNOSIS — D22.9 MULTIPLE NEVI: Primary | ICD-10-CM

## 2021-04-05 PROCEDURE — 99213 OFFICE O/P EST LOW 20 MIN: CPT | Performed by: DERMATOLOGY

## 2021-04-05 NOTE — PROGRESS NOTES
Dermatology Patient Note  [facility] [address]  [address]  [address] [address]  Dept: [phone]  Dept Fax: [phone]      VISITDATE: 4/5/2021   REFERRING PROVIDER: No ref. provider found      Pérez Cassidy is a 37 y.o. female  who presents today in the office for:    Other (Pt has had mole mapping in the past w/ Dr Felix Colon. She is here to have her moles rechecked today. )      PERTINENT HISTORY NOT LISTED ABOVE:  Patient presents for mole f/u  - no lesions of concern today  - Dr Felix Colon was following some moles of her adomen    CURRENT MEDICATIONS:   No current outpatient medications on file. No current facility-administered medications for this visit. ALLERGIES:   No Known Allergies    SOCIAL HISTORY:  Social History     Tobacco Use    Smoking status: Never Smoker    Smokeless tobacco: Never Used   Substance Use Topics    Alcohol use: Yes     Comment: occasional       Pertinent ROS:  Review of Systems  Skin: Denies any new changing, growing or bleeding lesions or rashes except as described in the HPI   Constitutional: Denies fevers, chills, and malaise. PHYSICAL EXAM:   /83 (Site: Left Upper Arm, Position: Sitting, Cuff Size: Medium Adult)   Pulse 74   Temp 97.6 °F (36.4 °C) (Temporal)   Ht 5' 9\" (1.753 m)   Wt 139 lb 12.8 oz (63.4 kg)   SpO2 100%   BMI 20.64 kg/m²     The patient is generally well appearing, well nourished, alert and conversational. Affect is normal.    Cutaneous Exam:  Physical Exam  Total body skin exam, including head/face, neck, both arms, chest, back, abdomen, both legs, buttocks, digits and/or nails, was examined. Genital exam was deferred as patient denied having any lesions in this area. Diagnoses/exam findings/medical history pertinent to this visit are listed below:    Assessment and Plan:  Assessment   1. Multiple nevi  - Clinically and dermatoscopically benign on exam today.   - Common nevi have a low individual risk of developing into melanoma. Patients with >50 nevi have a greater risk of developing melanoma in their lifetime and should undergo skin checks at least annually. - I recommended the patient apply broad spectrum spf 30+ sunscreen daily, reapplying every 2 hours  - In additional to regular use of sunscreen, I recommended broad-rimmed hats, long sleeves, and seeking the shade.  - nevi on abdomen are unchanged          RTC 1 year    Patient Instructions   Yoel Dixon     There are two types of sun rays that are harmful to the skin. UVA rays cause skin aging and skin cancer, such as melanoma. UVB rays cause sunburns, cataracts, and also contribute to skin cancer. The American-[employer]y [employer] recommends that children and adults wear a broad spectrum, waterproof sunscreen with a Sun Protection Factor (SPF) of 30 or higher. It is important to check the ingredient label to be sure the sunscreen will protect the skin from both UVA and UVB sunrays. Your sunscreen should contain at least one of the following ingredients: titanium dioxide, zinc oxide, or avobenzone. Sunscreen will not be effective unless it is applied to all exposed skin. Sunscreens work best if they are applied 30 minutes before sun exposure. They should be reapplied every 2 hours and after any water exposure. Sunscreen is not perfect. It is important to use other methods to protect the skin from sun exposure also. Wear hats, sunglasses and other sun protective clothing when outdoors. Stay in the shade during the peak hours of sun exposure between 10 AM and 4 PM.    Moles    Moles, or nevi, are very common. Moles are areas of the skin where there are more cells called melanocytes. Melanocytes are the cells in the body that produce pigment, or color. Moles can be many colors including skin-tone, pink, tan, brown, and very dark brown to black. Moles can be raised or flat. Moles can have hair.  Moles can grow on any skin surface, including the scalp, hands and feet. When someone is born with a mole, or develops one in the first months of life, the mole is called a congenital, or birthmark mole. About 1 in 100 people are born with one or more moles. Most people develop their moles later in childhood or adulthood. These are called acquired moles. They are most common on sun exposed areas of skin such as the face, neck, upper body, arms and legs. CHECKING MOLES  Most moles are harmless, but in rare cases moles may become cancerous. Checking moles and looking for changes is an important step in helping to catch worrisome changes early. Some changes to look for are asymmetry (moles that do not look the same on each half), irregular shapes or borders, uneven color or large size. Also look for any moles that bleed, itch, or become painful. Looking at your skin regularly can help you recognize moles that are more at risk for becoming cancerous. WHEN TO CALL THE DOCTOR  Call your doctor if you see any of the following changes in a mole:       Irregular borders (uneven shape or edges)       Changes in color to black, blue or red.      Changes in the surface texture       Scabs, scaling, irritation or bleeding in the mole    TREATMENT FOR MOLES  Often we can simply look at your moles and tell you if they look worrisome. If we are not concerned about the look of your moles at your appointment, we may measure some moles and take some photos that will allow us to watch for future changes in the moles. TREATMENT FOR MOLES  If a mole is getting irritated frequently, bleeding, difficult to watch due to location or dark color, atypical in appearance, or worrisome, we may perform a skin biopsy. A skin biopsy is a procedure that involves removing the mole so that it can be looked at under a microscope. There are many methods used to remove moles.  The method we choose depends on the location of the mole, the size of the mole, and the amount of concern for skin cancer. Generally, removing moles in the dermatologists office is a simple and safe procedure that can be done with local anesthesia. PREVENTION  You can do some things to prevent moles from becoming cancerous:       Try to avoid long periods of time in the sun and severe sunburns. The sun is        especially dangerous between 10:00 am and 4:00 pm.       Use a broad spectrum, water-resistant sun block lotion with an SPF of 30 or        greater. A broad spectrum lotion blocks both UVA and UVB rays from the sun. Re-apply sunscreen at least every 2 hours and after swimming or sweating.      Take advantage of shade whenever possible. Wear a broad-brimmed hat,        sunglasses, and protective clothing when outdoors.      Do not use tanning beds. This note was created with the assistance of a speech-recognition program.  Although the intention is to generate a document that actually reflects the content of the visit, no guarantees can be provided that every mistake has been identified and corrected byediting.     Electronically signed by Bay Ledezma MD on 4/5/21 at 8:25 AM JAKUB

## 2021-04-05 NOTE — PATIENT INSTRUCTIONS
harmless, but in rare cases moles may become cancerous. Checking moles and looking for changes is an important step in helping to catch worrisome changes early. Some changes to look for are asymmetry (moles that do not look the same on each half), irregular shapes or borders, uneven color or large size. Also look for any moles that bleed, itch, or become painful. Looking at your skin regularly can help you recognize moles that are more at risk for becoming cancerous. WHEN TO CALL THE DOCTOR  Call your doctor if you see any of the following changes in a mole:       Irregular borders (uneven shape or edges)       Changes in color to black, blue or red.      Changes in the surface texture       Scabs, scaling, irritation or bleeding in the mole    TREATMENT FOR MOLES  Often we can simply look at your moles and tell you if they look worrisome. If we are not concerned about the look of your moles at your appointment, we may measure some moles and take some photos that will allow us to watch for future changes in the moles. TREATMENT FOR MOLES  If a mole is getting irritated frequently, bleeding, difficult to watch due to location or dark color, atypical in appearance, or worrisome, we may perform a skin biopsy. A skin biopsy is a procedure that involves removing the mole so that it can be looked at under a microscope. There are many methods used to remove moles. The method we choose depends on the location of the mole, the size of the mole, and the amount of concern for skin cancer. Generally, removing moles in the dermatologists office is a simple and safe procedure that can be done with local anesthesia. PREVENTION  You can do some things to prevent moles from becoming cancerous:       Try to avoid long periods of time in the sun and severe sunburns.  The sun is        especially dangerous between 10:00 am and 4:00 pm.       Use a broad spectrum, water-resistant sun block lotion with an SPF of 30 or        greater. A broad spectrum lotion blocks both UVA and UVB rays from the sun. Re-apply sunscreen at least every 2 hours and after swimming or sweating.      Take advantage of shade whenever possible. Wear a broad-brimmed hat,        sunglasses, and protective clothing when outdoors.      Do not use tanning beds.

## 2022-01-28 ENCOUNTER — OFFICE VISIT (OUTPATIENT)
Dept: FAMILY MEDICINE CLINIC | Age: 42
End: 2022-01-28
Payer: COMMERCIAL

## 2022-01-28 VITALS
HEART RATE: 68 BPM | DIASTOLIC BLOOD PRESSURE: 76 MMHG | BODY MASS INDEX: 19.2 KG/M2 | TEMPERATURE: 97.3 F | WEIGHT: 130 LBS | OXYGEN SATURATION: 98 % | SYSTOLIC BLOOD PRESSURE: 116 MMHG

## 2022-01-28 DIAGNOSIS — Z13.220 SCREENING CHOLESTEROL LEVEL: ICD-10-CM

## 2022-01-28 DIAGNOSIS — Z00.00 ENCOUNTER FOR WELL ADULT EXAM WITHOUT ABNORMAL FINDINGS: Primary | ICD-10-CM

## 2022-01-28 DIAGNOSIS — Z13.29 THYROID DISORDER SCREEN: ICD-10-CM

## 2022-01-28 PROCEDURE — 99396 PREV VISIT EST AGE 40-64: CPT | Performed by: NURSE PRACTITIONER

## 2022-01-28 SDOH — ECONOMIC STABILITY: FOOD INSECURITY: WITHIN THE PAST 12 MONTHS, YOU WORRIED THAT YOUR FOOD WOULD RUN OUT BEFORE YOU GOT MONEY TO BUY MORE.: NEVER TRUE

## 2022-01-28 SDOH — ECONOMIC STABILITY: FOOD INSECURITY: WITHIN THE PAST 12 MONTHS, THE FOOD YOU BOUGHT JUST DIDN'T LAST AND YOU DIDN'T HAVE MONEY TO GET MORE.: NEVER TRUE

## 2022-01-28 ASSESSMENT — PATIENT HEALTH QUESTIONNAIRE - PHQ9
SUM OF ALL RESPONSES TO PHQ9 QUESTIONS 1 & 2: 0
SUM OF ALL RESPONSES TO PHQ QUESTIONS 1-9: 0
SUM OF ALL RESPONSES TO PHQ QUESTIONS 1-9: 0
1. LITTLE INTEREST OR PLEASURE IN DOING THINGS: 0
SUM OF ALL RESPONSES TO PHQ QUESTIONS 1-9: 0
SUM OF ALL RESPONSES TO PHQ QUESTIONS 1-9: 0
2. FEELING DOWN, DEPRESSED OR HOPELESS: 0

## 2022-01-28 ASSESSMENT — SOCIAL DETERMINANTS OF HEALTH (SDOH): HOW HARD IS IT FOR YOU TO PAY FOR THE VERY BASICS LIKE FOOD, HOUSING, MEDICAL CARE, AND HEATING?: NOT HARD AT ALL

## 2022-01-28 NOTE — PROGRESS NOTES
TIAGO Vilchis  [address]  [id number] [facility]. [facility]  [phone]  [phone]    Enzo Thayer is a 37 y.o. female presents today for Chief Complaint: New Patient      Patient is Accompanied by: n/a    HPI - Enzo Thayer is here today for the following: Establish Care and Annual Wellness Visit    She has no complaints at today's visit      There is no problem list on file for this patient. History reviewed. No pertinent past medical history. Past Surgical History:   Procedure Laterality Date    EYE SURGERY Right     lazy eye as infant     History reviewed. No pertinent family history. Social History     Tobacco Use    Smoking status: Never Smoker    Smokeless tobacco: Never Used   Substance Use Topics    Alcohol use: Yes     Comment: occasional     ALLERGIES:  No Known Allergies       Subjective     · Constitutional:  Negative for activity change, appetite change,unexpected weight change, chills, fever, and fatigue. · HENT: Negative for ear pain, sore throat,  Rhinorrhea, sinus pain, sinus pressure, congestion. · Eyes:  Negative for pain and discharge. · Respiratory:  Negative for chest tightness, shortness of breath, wheezing, and cough. · Cardiovascular:  Negative for chest pain, palpitations and leg swelling. · Gastrointestinal: Negative for abdominal pain, blood in stool, constipation,diarrhea, nausea and vomiting. · Endocrine: Negative for cold intolerance, heat intolerance, polydipsia, polyphagia and polyuria. · Genitourinary: Negative for difficulty urinating, dysuria, flank pain, frequency, hematuria and urgency. · Musculoskeletal: Negative for arthralgias, back pain, joint swelling, myalgias, neck pain and neck stiffness. · Skin: Negative for rash and wound. · Allergic/Immunologic: Negative for environmental allergies and food allergies. · Neurological:  Negative for dizziness, light-headedness, numbness and headaches. · Hematological:  Negative for adenopathy. Does not bruise/bleed easily. · Psychiatric/Behavioral: Negative for self-injury, sleep disturbance and suicidal ideas. Objective     PHYSICAL EXAM:   · Constitutional: [place] is oriented to person, place, and time. Vital signs are normal. Appears well-developed and well-nourished. · HEENT:   · Head: Normocephalic and atraumatic. Right Ear: Hearing and external ear normal. TM normal  Canal normal  · Left Ear: Hearing and external ear normal. TM normal Canal normal  · Nose: Nares normal. Septum midline. No drainage or sinus tenderness. Mucosa pink and moist  · Mouth/Throat: Oropharynx- No erythema, no exudate. Uvula midline, no erythema, no edema. Mucous membranes are pink and moist.   · Eyes:PERRL, EOMI, Conjunctiva normal, No discharge. · Neck: Full passive range of motion. Non-tender on palpation. Neck supple. No thyromegaly present. Trachea normal.  · Cardiovascular: Normal rate, regular rhythm, S1, S2, no murmur, no gallop, no friction rub, intact distal pulses. No carotid bruit. No lower extremity edema  · Pulmonary/Chest: Breath sounds are clear throughout, No respiratory distress, No wheezing, No chest tenderness. Effort normal  · Musculoskeletal: Extremities appear regular and symmetric. No evident masses, lesions, foreign bodies, or other abnormalities. No edema. No tenderness on palpation. Joints are stable. Full ROM, strength and tone are within normal limits. · Lymphadenopathy: No lymphadenopathy noted. · Neurological: Alert and oriented to person, place, and time. Normal motor function, Normal sensory function, No focal deficits noted. He has normal strength. · Skin: Skin is warm, dry and intact. No obvious lesions on exposed skin  · Psychiatric: Normal mood and affect. Speech is normal and behavior is normal.     Nursing note and vitals reviewed.   Blood pressure 116/76, pulse 68, temperature 97.3 °F (36.3 °C), temperature source Temporal, weight 130 lb (59 kg), last menstrual period 01/26/2022, SpO2 98 %, not currently breastfeeding. Body mass index is 19.2 kg/m². Wt Readings from Last 3 Encounters:   01/28/22 130 lb (59 kg)   04/05/21 139 lb 12.8 oz (63.4 kg)   09/21/20 136 lb 3.2 oz (61.8 kg)     BP Readings from Last 3 Encounters:   01/28/22 116/76   04/05/21 123/83   09/21/20 120/80       No results found for this visit on 01/28/22. Completed Orders/Prescriptions   No orders of the defined types were placed in this encounter. AssessmentPlan/Medical Decision Making     1. Encounter for well adult exam without abnormal findings  - CBC With Auto Differential; Future  - Comprehensive Metabolic Panel; Future    2. Screening cholesterol level  - Lipid, Fasting; Future    3. Thyroid disorder screen  - TSH; Future  - T4, Free; Future      Return in about 1 year (around 1/28/2023). 1.  Kaia Aceves received counseling on the following healthy behaviors: nutrition, exercise and medication adherence  2. Patient given educational materials - see patient instructions  3. Was a self-tracking handout given in paper form or via [employer]? No  If yes, see orders or list here. 4.  Discussed use, benefit, and side effects of prescribed medications. Barriers to medication compliance addressed. All patient questions answered. Pt voiced understanding. 5.  Reviewed prior labs, imaging, consultation, follow up, and health maintenance  6. Continue current medications, diet and exercise. 7. Discussed use, benefit, and side effects of prescribed medications. Barriers to medication compliance addressed. All her questions were answered. Pt voiced understanding. Kaia Aceves will continue current medications, diet and exercise.       Medical Decision Making: Low    Of the 25 minute duration appointment visit, Kay Lamas CNP spent at least 50% of the face-to-face time in counseling, explanation of diagnosis, planning of further management, and answering all questions. Signed:  Molina Jaquez CNP    This note is created with the assistance of a speech-recognition program.  While intending to generate a document that actually reflects the content of the visit, no guarantees can be provided that every mistake has been identified and corrected by editing.

## 2022-11-03 ENCOUNTER — OFFICE VISIT (OUTPATIENT)
Dept: DERMATOLOGY | Age: 42
End: 2022-11-03
Payer: COMMERCIAL

## 2022-11-03 VITALS
TEMPERATURE: 98 F | OXYGEN SATURATION: 100 % | SYSTOLIC BLOOD PRESSURE: 98 MMHG | BODY MASS INDEX: 19.55 KG/M2 | DIASTOLIC BLOOD PRESSURE: 58 MMHG | HEIGHT: 69 IN | HEART RATE: 65 BPM | WEIGHT: 132 LBS

## 2022-11-03 DIAGNOSIS — D22.9 MULTIPLE BENIGN NEVI: Primary | ICD-10-CM

## 2022-11-03 PROCEDURE — 99213 OFFICE O/P EST LOW 20 MIN: CPT | Performed by: PHYSICIAN ASSISTANT

## 2022-11-03 NOTE — PROGRESS NOTES
Dermatology Patient Note  [address]  [facility]  Dept: [phone]  Dept Fax: [phone]      VISITDATE: 11/3/2022   REFERRING PROVIDER: No ref. provider found      Jonathan Mcallister is a 40 y.o. female  who presents today in the office for:    Follow-up (1y FBSE, pt does not have any new concerns)      HISTORY OF PRESENT ILLNESS:  As above. Many nevi, widespread. No lesions that are symptomatic or changing. MEDICAL PROBLEMS:  There are no problems to display for this patient. CURRENT MEDICATIONS:   No current outpatient medications on file. No current facility-administered medications for this visit. ALLERGIES:   No Known Allergies    SOCIAL HISTORY:  Social History     Tobacco Use    Smoking status: Never    Smokeless tobacco: Never   Substance Use Topics    Alcohol use: Yes     Comment: occasional       Pertinent ROS:  Review of Systems  Skin: Denies any new changing, growing or bleeding lesions or rashes except as described in the HPI   Constitutional: Denies fevers, chills, and malaise. PHYSICAL EXAM:   BP (!) 98/58   Pulse 65   Temp 98 °F (36.7 °C) (Temporal)   Ht 5' 9\" (1.753 m)   Wt 132 lb (59.9 kg)   SpO2 100%   BMI 19.49 kg/m²     The patient is generally well appearing, well nourished, alert and conversational. Affect is normal.    Cutaneous Exam:  Physical Exam  Total body skin exam excluding external genitalia: head/face, neck, both arms, chest, back, abdomen, both legs, buttocks, digits and/or nails, was examined. Genital exam was deferred as patient denied having any lesions in this area. Complete visualization of scalp may be limited by hair density, length, and/or style    Facial covering was removed during examination. Diagnoses/exam findings/medical history pertinent to this visit are listed below:    Assessment:   Diagnosis Orders   1.  Multiple benign nevi Plan:  1. Multiple benign nevi  - reassurance and education   - patient was counseled that UV-damaged skin increases lifetime risk for skin cancer  - I recommended the patient apply broad spectrum spf 30+ sunscreen daily, reapplying every 2 hours. - In additional to regular use of sunscreen, I recommended broad-rimmed hats, long sleeves, and seeking the shade.   - Pt was educated on the ABCDE's of melanoma as well      RTC 1Y    Future Appointments   Date Time Provider Gavi Sandhu   11/1/2023  3:45 PM Yoanna Tan PA-C  [facility]         There are no Patient Instructions on file for this visit.       Electronically signed by Yoanna Tan PA-C on 11/3/22 at 5:56 PM EDT

## 2022-11-07 ENCOUNTER — TELEPHONE (OUTPATIENT)
Dept: DERMATOLOGY | Age: 42
End: 2022-11-07

## 2022-11-07 DIAGNOSIS — B36.0 TINEA VERSICOLOR: Primary | ICD-10-CM

## 2022-11-07 RX ORDER — FLUCONAZOLE 150 MG/1
TABLET ORAL
Qty: 3 TABLET | Refills: 2 | Status: SHIPPED | OUTPATIENT
Start: 2022-11-07

## 2022-11-07 NOTE — TELEPHONE ENCOUNTER
Pt said you were supposed to send an rx over for some white spots or a was maybe ? Pt was not clear when she specified.